# Patient Record
Sex: MALE | Race: WHITE
[De-identification: names, ages, dates, MRNs, and addresses within clinical notes are randomized per-mention and may not be internally consistent; named-entity substitution may affect disease eponyms.]

---

## 2019-01-12 ENCOUNTER — HOSPITAL ENCOUNTER (EMERGENCY)
Dept: HOSPITAL 63 - ER | Age: 74
Discharge: HOME | End: 2019-01-12
Payer: COMMERCIAL

## 2019-01-12 VITALS — DIASTOLIC BLOOD PRESSURE: 78 MMHG | SYSTOLIC BLOOD PRESSURE: 170 MMHG

## 2019-01-12 VITALS — HEIGHT: 68 IN | WEIGHT: 148 LBS | BODY MASS INDEX: 22.43 KG/M2

## 2019-01-12 DIAGNOSIS — F17.220: ICD-10-CM

## 2019-01-12 DIAGNOSIS — R07.89: ICD-10-CM

## 2019-01-12 DIAGNOSIS — Z88.5: ICD-10-CM
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
## 2019-01-12 DIAGNOSIS — S02.40FA: ICD-10-CM
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
## 2019-01-12 DIAGNOSIS — S02.612A: Primary | ICD-10-CM
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
## 2019-01-12 DIAGNOSIS — S02.32XA: ICD-10-CM
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
## 2019-01-12 DIAGNOSIS — S02.40DA: ICD-10-CM

## 2019-01-12 LAB
ALBUMIN SERPL-MCNC: 3.8 G/DL (ref 3.4–5)
ALBUMIN/GLOB SERPL: 1.1 {RATIO} (ref 1–1.7)
ALP SERPL-CCNC: 121 U/L (ref 46–116)
ALT SERPL-CCNC: 23 U/L (ref 16–63)
AMPHETAMINE/METHAMPHETAMINE: (no result)
ANION GAP SERPL CALC-SCNC: 8 MMOL/L (ref 6–14)
APTT PPP: (no result) S
AST SERPL-CCNC: 28 U/L (ref 15–37)
BACTERIA #/AREA URNS HPF: 0 /HPF
BARBITURATES UR-MCNC: (no result) UG/ML
BASOPHILS # BLD AUTO: 0 X10^3/UL (ref 0–0.2)
BASOPHILS NFR BLD: 1 % (ref 0–3)
BENZODIAZ UR-MCNC: (no result) UG/L
BILIRUB SERPL-MCNC: 0.6 MG/DL (ref 0.2–1)
BILIRUB UR QL STRIP: (no result)
BUN/CREAT SERPL: 6 (ref 6–20)
CA-I SERPL ISE-MCNC: 5 MG/DL (ref 8–26)
CALCIUM SERPL-MCNC: 8.8 MG/DL (ref 8.5–10.1)
CANNABINOIDS UR-MCNC: (no result) UG/L
CHLORIDE SERPL-SCNC: 93 MMOL/L (ref 98–107)
CO2 SERPL-SCNC: 27 MMOL/L (ref 21–32)
COCAINE UR-MCNC: (no result) NG/ML
CREAT SERPL-MCNC: 0.8 MG/DL (ref 0.7–1.3)
EOSINOPHIL NFR BLD: 0.2 X10^3/UL (ref 0–0.7)
EOSINOPHIL NFR BLD: 4 % (ref 0–3)
ERYTHROCYTE [DISTWIDTH] IN BLOOD BY AUTOMATED COUNT: 14.2 % (ref 11.5–14.5)
FIBRINOGEN PPP-MCNC: CLEAR MG/DL
GFR SERPLBLD BASED ON 1.73 SQ M-ARVRAT: 94.5 ML/MIN
GLOBULIN SER-MCNC: 3.6 G/DL (ref 2.2–3.8)
GLUCOSE SERPL-MCNC: 103 MG/DL (ref 70–99)
GLUCOSE UR STRIP-MCNC: (no result) MG/DL
HCT VFR BLD CALC: 45.8 % (ref 39–53)
HGB BLD-MCNC: 15.4 G/DL (ref 13–17.5)
LIPASE: 121 U/L (ref 73–393)
LYMPHOCYTES # BLD: 0.9 X10^3/UL (ref 1–4.8)
LYMPHOCYTES NFR BLD AUTO: 17 % (ref 24–48)
MCH RBC QN AUTO: 31 PG (ref 25–35)
MCHC RBC AUTO-ENTMCNC: 34 G/DL (ref 31–37)
MCV RBC AUTO: 91 FL (ref 79–100)
METHADONE SERPL-MCNC: (no result) NG/ML
MONO #: 0.5 X10^3/UL (ref 0–1.1)
MONOCYTES NFR BLD: 9 % (ref 0–9)
NEUT #: 3.7 X10^3UL (ref 1.8–7.7)
NEUTROPHILS NFR BLD AUTO: 69 % (ref 31–73)
NITRITE UR QL STRIP: (no result)
OPIATES UR-MCNC: (no result) NG/ML
PCP SERPL-MCNC: (no result) MG/DL
PLATELET # BLD AUTO: 212 X10^3/UL (ref 140–400)
POTASSIUM SERPL-SCNC: 4.7 MMOL/L (ref 3.5–5.1)
PROT SERPL-MCNC: 7.4 G/DL (ref 6.4–8.2)
RBC # BLD AUTO: 5.06 X10^6/UL (ref 4.3–5.7)
RBC #/AREA URNS HPF: (no result) /HPF (ref 0–2)
SODIUM SERPL-SCNC: 128 MMOL/L (ref 136–145)
SP GR UR STRIP: 1.01
SQUAMOUS #/AREA URNS LPF: (no result) /LPF
UROBILINOGEN UR-MCNC: 0.2 MG/DL
WBC # BLD AUTO: 5.4 X10^3/UL (ref 4–11)
WBC #/AREA URNS HPF: 0 /HPF (ref 0–4)

## 2019-01-12 PROCEDURE — 83605 ASSAY OF LACTIC ACID: CPT

## 2019-01-12 PROCEDURE — 86901 BLOOD TYPING SEROLOGIC RH(D): CPT

## 2019-01-12 PROCEDURE — 86850 RBC ANTIBODY SCREEN: CPT

## 2019-01-12 PROCEDURE — 70486 CT MAXILLOFACIAL W/O DYE: CPT

## 2019-01-12 PROCEDURE — 36415 COLL VENOUS BLD VENIPUNCTURE: CPT

## 2019-01-12 PROCEDURE — 85730 THROMBOPLASTIN TIME PARTIAL: CPT

## 2019-01-12 PROCEDURE — 70450 CT HEAD/BRAIN W/O DYE: CPT

## 2019-01-12 PROCEDURE — 71045 X-RAY EXAM CHEST 1 VIEW: CPT

## 2019-01-12 PROCEDURE — 83690 ASSAY OF LIPASE: CPT

## 2019-01-12 PROCEDURE — 84484 ASSAY OF TROPONIN QUANT: CPT

## 2019-01-12 PROCEDURE — 85610 PROTHROMBIN TIME: CPT

## 2019-01-12 PROCEDURE — 85025 COMPLETE CBC W/AUTO DIFF WBC: CPT

## 2019-01-12 PROCEDURE — 99284 EMERGENCY DEPT VISIT MOD MDM: CPT

## 2019-01-12 PROCEDURE — 80307 DRUG TEST PRSMV CHEM ANLYZR: CPT

## 2019-01-12 PROCEDURE — 72125 CT NECK SPINE W/O DYE: CPT

## 2019-01-12 PROCEDURE — 80053 COMPREHEN METABOLIC PANEL: CPT

## 2019-01-12 PROCEDURE — 86900 BLOOD TYPING SEROLOGIC ABO: CPT

## 2019-01-12 PROCEDURE — 81001 URINALYSIS AUTO W/SCOPE: CPT

## 2019-01-12 PROCEDURE — 93005 ELECTROCARDIOGRAM TRACING: CPT

## 2019-01-12 PROCEDURE — 12011 RPR F/E/E/N/L/M 2.5 CM/<: CPT

## 2019-01-12 NOTE — EKG
[facility] [address], [place] [address]

Test Date:    2019               Test Time:    11:06:38

Pat Name:     AMADOU GARCÍA         Department:   

Patient ID:   [id number]           Room:          

Gender:       M                        Technician:   NILDA

:          1945               Requested By: DARIO GUO

Order Number: [id number]            Reading MD:   David Jacobo MD

                                 Measurements

Intervals                              Axis          

Rate:         0                        P:            

HI:                                    QRS:          0

QRSD:         0                        T:            0

QT:           0                                      

QTc:          0                                      

                           Interpretive Statements

SINUS ARRHYTHMIA

VENTRICULAR PREMATURE COMPLEX(ES)

1ST DEGREE AVB

Electronically Signed On 1- 10:06:23 CST by David Jacobo MD
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
